# Patient Record
Sex: FEMALE | Race: ASIAN | NOT HISPANIC OR LATINO | Employment: PART TIME | URBAN - METROPOLITAN AREA
[De-identification: names, ages, dates, MRNs, and addresses within clinical notes are randomized per-mention and may not be internally consistent; named-entity substitution may affect disease eponyms.]

---

## 2021-03-03 ENCOUNTER — OFFICE VISIT - RIVER FALLS (OUTPATIENT)
Dept: FAMILY MEDICINE | Facility: CLINIC | Age: 26
End: 2021-03-03

## 2021-03-03 ASSESSMENT — MIFFLIN-ST. JEOR: SCORE: 1667.09

## 2022-01-02 PROCEDURE — U0003 INFECTIOUS AGENT DETECTION BY NUCLEIC ACID (DNA OR RNA); SEVERE ACUTE RESPIRATORY SYNDROME CORONAVIRUS 2 (SARS-COV-2) (CORONAVIRUS DISEASE [COVID-19]), AMPLIFIED PROBE TECHNIQUE, MAKING USE OF HIGH THROUGHPUT TECHNOLOGIES AS DESCRIBED BY CMS-2020-01-R: HCPCS | Mod: ORL | Performed by: NURSE PRACTITIONER

## 2022-01-07 ENCOUNTER — LAB REQUISITION (OUTPATIENT)
Dept: LAB | Facility: CLINIC | Age: 27
End: 2022-01-07
Payer: COMMERCIAL

## 2022-01-07 ENCOUNTER — AMBULATORY - RIVER FALLS (OUTPATIENT)
Dept: FAMILY MEDICINE | Facility: CLINIC | Age: 27
End: 2022-01-07

## 2022-01-07 ENCOUNTER — OFFICE VISIT - RIVER FALLS (OUTPATIENT)
Dept: FAMILY MEDICINE | Facility: CLINIC | Age: 27
End: 2022-01-07

## 2022-01-07 DIAGNOSIS — U07.1 COVID-19: ICD-10-CM

## 2022-01-08 LAB — SARS-COV-2 RNA RESP QL NAA+PROBE: POSITIVE

## 2022-01-09 ENCOUNTER — COMMUNICATION - RIVER FALLS (OUTPATIENT)
Dept: FAMILY MEDICINE | Facility: CLINIC | Age: 27
End: 2022-01-09

## 2022-01-09 ENCOUNTER — NURSE TRIAGE (OUTPATIENT)
Dept: NURSING | Facility: CLINIC | Age: 27
End: 2022-01-09
Payer: COMMERCIAL

## 2022-01-09 NOTE — TELEPHONE ENCOUNTER
"Coronavirus (COVID-19) Notification    Caller Name (Patient, parent, daughter/son, grandparent, etc)  Patient    Reason for call  Notify of Positive Coronavirus (COVID-19) lab results, assess symptoms,  review  [employer] recommendations    Lab Result    Lab test:  2019-nCoV rRt-PCR or SARS-CoV-2 PCR    Oropharyngeal AND/OR nasopharyngeal swabs is POSITIVE for 2019-nCoV RNA/SARS-COV-2 PCR (COVID-19 virus)    RN Recommendations/Instructions per [facility] Coronavirus COVID-19 recommendations    Brief introduction script  Introduce self then review script:  \"I am calling on behalf of [employer].  We were notified that your Coronavirus test (COVID-19) for was POSITIVE for the virus.  I have some information to relay to you but first I wanted to mention that the [place] Dept of Health will be contacting you shortly [it's possible MD already called Patient] to talk to you more about how you are feeling and other people you have had contact with who might now also have the virus.  Also,  [employer] is Partnering with the [facility] for Covid-19 research, you may be contacted directly by research staff.\"    Assessment (Inquire about Patient's current symptoms)   Assessment   Current Symptoms at time of phone call: (if no symptoms, document No symptoms] Cough, sore throat   Symptoms onset (if applicable) 8 days     If at time of call, Patients symptoms hare worsened, the Patient should contact 911 or have someone drive them to Emergency Dept promptly:      If Patient calling 911, inform 911 personal that you have tested positive for the Coronavirus (COVID-19).  Place mask on and await 911 to arrive.    If Emergency Dept, If possible, please have another adult drive you to the Emergency Dept but you need to wear mask when in contact with other people.      Monoclonal Antibody Administration    You may be eligible to receive a new treatment with a monoclonal antibody for preventing hospitalization " in patients at high risk for complications from COVID-19.   This medication is still experimental and available on a limited basis; it is given through an IV and must be given at an infusion center. Please note that not all people who are eligible will receive the medication since it is in limited supply.     Are you interested in being considered for this medication?  Yes.   Is the patient symptomatic?  Yes. Is the patient 18 years of age or older? Yes.  Is the patient newly (within the last week) on supplemental oxygen or requiring more oxygen than usual?  No. Patient criteria for selection: Is the patients weight equal to or greater than 40 kg (88 lbs)? Yes.  Is the patient's age 65 years or older?  No. Is the patient 55 years or older and have one or more of the following conditions: Hypertension, CHF, COPD/Chronic pulmonary disease?  No. Is the patient 18 years or older and has one or more of the following conditions: Chronic Kidney Disease, Diabetes Mellitus, BMI equal to or greater than 35, Immunosuppressive Disease or taking Immunosuppressive medications?  Yes.  Patient qualifies[place] refer [place].  Does the patient fit the criteria: Yes: Patient referred to [place] website, patient or family/friend will complete application.    Review information with Patient    Your result was positive. This means you have COVID-19 (coronavirus).  We have sent you a letter that reviews the information that I'll be reviewing with you now.    How can I protect others?    If you have symptoms: stay home and away from others (self-isolate) until:    You've had no fever--and no medicine that reduces fever--for 1 full day (24 hours). And       Your other symptoms have gotten better. For example, your cough or breathing has improved. And     At least 10 days have passed since your symptoms started. (If you've been told by a doctor that you have a weak immune system, wait 20 days.)     If you don't have symptoms: Stay home and away  from others (self-isolate) until at least 10 days have passed since your first positive COVID-19 test. (Date test collected)    During this time:    Stay in your own room, including for meals. Use your own bathroom if you can.    Stay away from others in your home. No hugging, kissing or shaking hands. No visitors.     Don't go to work, school or anywhere else.     Clean  high touch  surfaces often (doorknobs, counters, handles, etc.). Use a household cleaning spray or wipes. You'll find a full list on the EPA website at www.epa.gov/pesticide-registration/list-n-disinfectants-use-against-sars-cov-2.     Cover your mouth and nose with a mask, tissue or other face covering to avoid spreading germs.    Wash your hands and face often with soap and water.    Make a list of people you have been in close contact with recently, even if either of you wore a face covering.   - Start your list from 2 days before you became ill or had a positive test.  - Include anyone that was within 6 feet of you for a cumulative total of 15 minutes or more in 24 hours. (Example: if you sat next to Josh for 5 minutes in the morning and 10 minutes in the afternoon, then you were in close contact for 15 minutes total that day. Josh would be added to your list.)    A public health worker will call or text you. It is important that you answer. They will ask you questions about possible exposures to COVID-19, such as people you have been in direct contact with and places you have visited.    Tell the people on your list that you have COVID-19; they should stay away from others for 14 days starting from the last time they were in contact with you (unless you are told something different from a public health worker).     Caregivers in these groups are at risk for severe illness due to COVID-19:  o People 65 years and older  o People who live in a nursing home or long-term care facility  o People with chronic disease (lung, heart, cancer, diabetes,  kidney, liver, immunologic)  o People who have a weakened immune system, including those who:  - Are in cancer treatment  - Take medicine that weakens the immune system, such as corticosteroids  - Had a bone marrow or organ transplant  - Have an immune deficiency  - Have poorly controlled HIV or AIDS  - Are obese (body mass index of 40 or higher)  - Smoke regularly    Caregivers should wear gloves while washing dishes, handling laundry and cleaning bedrooms and bathrooms.    Wash and dry laundry with special caution. Don't shake dirty laundry, and use the warmest water setting you can.    If you have a weakened immune system, ask your doctor about other actions you should take.    For more tips, go to www.cdc.gov/coronavirus/2019-ncov/downloads/10Things.pdf.    You should not go back to work until you meet the guidelines above for ending your home isolation. You don't need to be retested for COVID-19 before going back to work--studies show that you won't spread the virus if it's been at least 10 days since your symptoms started (or 20 days, if you have a weak immune system).    Employers: This document serves as formal notice of your employee's medical guidelines for going back to work. They must meet the above guidelines before going back to work in person.    How can I take care of myself?    1. Get lots of rest. Drink extra fluids (unless a doctor has told you not to).    2. Take Tylenol (acetaminophen) for fever or pain. If you have liver or kidney problems, ask your family doctor if it's okay to take Tylenol.     Take either:     650 mg (two 325 mg pills) every 4 to 6 hours, or     1,000 mg (two 500 mg pills) every 8 hours as needed.     Note: Don't take more than 3,000 mg in one day. Acetaminophen is found in many medicines (both prescribed and over-the-counter medicines). Read all labels to be sure you don't take too much.    For children, check the Tylenol bottle for the right dose (based on their age or  weight).    3. If you have other health problems (like cancer, heart failure, an organ transplant or severe kidney disease): Call your specialty clinic if you don't feel better in the next 2 days.    4. Know when to call 911: Emergency warning signs include:    Trouble breathing or shortness of breath    Pain or pressure in the chest that doesn't go away    Feeling confused like you haven't felt before, or not being able to wake up    Bluish-colored lips or face    5. Sign up for [employer]. We know it's scary to hear that you have COVID-19. We want to track your symptoms to make sure you're okay over the next 2 weeks. Please look for an email from [employer]--this is a free, online program that we'll use to keep in touch. To sign up, follow the link in the email. Learn more at [email or web address].    Where can I get more information?    [facility]: [email or web address]    Coronavirus Basics: www.health.[facility].[place]./diseases/coronavirus/basics.html    What to Do If You're Sick: www.cdc.gov/coronavirus/2019-ncov/about/steps-when-sick.html    Ending Home Isolation: www.cdc.gov/coronavirus/2019-ncov/hcp/disposition-in-home-patients.html     Caring for Someone with COVID-19: www.cdc.gov/coronavirus/2019-ncov/if-you-are-sick/care-for-someone.html     [facility] clinical trials (COVID-19 research studies): clinicalaffairs.[facility]-clinical-trials     A Positive COVID-19 letter will be sent via [employer] or the mail. (Exception, no letters sent to Presurgerical/Preprocedure Patients)    Arely Goldstein RN

## 2022-01-10 ENCOUNTER — COMMUNICATION - RIVER FALLS (OUTPATIENT)
Dept: FAMILY MEDICINE | Facility: CLINIC | Age: 27
End: 2022-01-10

## 2022-02-06 ENCOUNTER — HEALTH MAINTENANCE LETTER (OUTPATIENT)
Age: 27
End: 2022-02-06

## 2022-02-12 VITALS
BODY MASS INDEX: 36.02 KG/M2 | HEART RATE: 88 BPM | TEMPERATURE: 98.4 F | WEIGHT: 211 LBS | HEIGHT: 64 IN | SYSTOLIC BLOOD PRESSURE: 142 MMHG | DIASTOLIC BLOOD PRESSURE: 96 MMHG

## 2022-02-16 NOTE — PROGRESS NOTES
Patient:   JOSE BHATT            MRN: [id number]            FIN: [id number]               Age:   26 years     Sex:  Female     :  1995   Associated Diagnoses:   Encounter for other general counseling and advice on contraception   Author:   Jed Oakes MD      Visit Information      Date of Service: 2021 10:36 am  Performing Location: [facility]  Encounter#: [id number]      Primary Care Provider (PCP):  NONE ,       Referring Provider:  Jed Oakes MD    NPI# [id number]      Chief Complaint   3/3/2021 10:40 AM CST    Discuss irregular menses. Has had since teenager. Had tried ocp in January and bled for about 2 weeks with cramping. Did also make feel nauseous. Moved here from [facility]. Has one son who is 4.      Interval History   The patient is in today for discussion of OCP.  She moved from the [facility] within the past year and decided that she would like to be on birth control and started the pill in January.  She had a lot of nausea and headaches on the pill that she was given in [place] but does know the type of pill and cannot find it.  She stopped it after about a month because of side effects but is willing to try it again.  She has a 4-year-old child and when not pregnant prior to that and a couple of years after the child, she did have somewhat irregular periods.  Now she is  and is concerned often when she has prolonged cycles that she is pregnant and would like some piece of mind.  She has no history of GYN issues otherwise.  She speaks fairly fluent English but I did write down some notes for her for better communication.  We discussed other methods of birth control.  I told her about the mini-pill and wrote that down but she would like to have regular withdrawal bleeding and is willing to try the pill again.       Review of Systems   Review  of systems is negative except as documented under interval history.      Health Status   Allergies:     Allergic Reactions (Selected)  No Known Medication Allergies   Medications:  (Selected)   Prescriptions  Prescribed  ethinyl estradiol-levonorgestrel 20 mcg-90 mcg oral tablet: 1 tab(s), Oral, daily, # 90 tab(s), 3 Refill(s), Type: Maintenance, Pharmacy: [id number], 1 tab(s) Oral daily, 64, in, 03/03/21 10:40:00 CST, Height Measured, 211, lb, 03/03/21 10:40:00 CST, Weight Measured  Documented Medications  Documented  Fish Oil: Oral, 0 Refill(s), Type: Maintenance  Iron 100 Plus: Oral, daily, 0 Refill(s), Type: Maintenance  Vitamin C: daily, 0 Refill(s), Type: Maintenance  Vitamin D3: daily, 0 Refill(s), Type: Maintenance   Problem list:    All Problems  Obesity / SNOMED CT 7100526021 / Probable      Histories   Past Medical History:    No active or resolved past medical history items have been selected or recorded.   Family History:    No family history items have been selected or recorded.   Procedure history:    No active procedure history items have been selected or recorded.   Social History:        Electronic Cigarette/Vaping Assessment            Electronic Cigarette Use: Never.      Tobacco Assessment            Never (less than 100 in lifetime)        Physical Examination   Vital Signs   3/3/2021 10:40 AM CST Temperature Tympanic 98.4 DegF    Peripheral Pulse Rate 88 bpm    Pulse Site Radial artery    HR Method Manual    Systolic Blood Pressure 142 mmHg  HI    Diastolic Blood Pressure 96 mmHg  HI    Mean Arterial Pressure 111 mmHg    BP Site Right arm    BP Method Manual      Gynecologic:  No exam is done today..       Impression and Plan   Diagnosis     Encounter for other general counseling and advice on contraception ([id number] Z30.09).     Plan:  We had around a 20 minute discussion and no examination was done today.  The patient will be tried on Loestrin 1/20 which is hopefully is a bit lower dose than she had before and we will see how she does on that.  She will let us know if she  would like to switch to other contraception..    Patient Instructions:       Counseled: Patient, Regarding diagnosis, Regarding treatment, Regarding medications, Verbalized understanding.

## 2022-02-16 NOTE — NURSING NOTE
Comprehensive Intake Entered On:  3/3/2021 10:50 AM CST    Performed On:  3/3/2021 10:40 AM CST by Leslie Rodriguez CMA               Summary   Chief Complaint :   Discuss irregular menses. Has had since teenager. Had tried ocp in January and bled for about 2 weeks with cramping. Did also make feel nauseous. Moved here from [facility]. Has one son who is 4.    Last Menstrual Period :   2021 CST   Weight Measured :   211 lb(Converted to: 211 lb 0 oz, 95.708 kg)    Height Measured :   64 in(Converted to: 5 ft 4 in, 162.56 cm)    Body Mass Index :   36.21 kg/m2 (HI)    Body Surface Area :   2.08 m2   Systolic Blood Pressure :   142 mmHg (HI)    Diastolic Blood Pressure :   96 mmHg (HI)    Mean Arterial Pressure :   111 mmHg   Peripheral Pulse Rate :   88 bpm   BP Site :   Right arm   Pulse Site :   Radial artery   BP Method :   Manual   HR Method :   Manual   Temperature Tympanic :   98.4 DegF(Converted to: 36.9 DegC)    Leslie Rodriguez CMA - 3/3/2021 10:40 AM CST   Health Status   Allergies Verified? :   Yes   Medication History Verified? :   Yes   Pre-Visit Planning Status :   Completed   Tobacco Use? :   Never smoker   Leslie Rodriguez CMA - 3/3/2021 10:40 AM CST   Demographics   Last Name :   Destini   First Name :   Jane   Responsible Party Date of Birth () :   1995 CST   Contact Relationship to Patient (other) :   Patient   [place] Leslie DURAN - 3/3/2021 10:40 AM CST   Consents   Consent for Immunization Exchange :   Consent Granted   Consent for Immunizations to Providers :   Consent Granted   Leslie Rodriguez CMA - 3/3/2021 10:40 AM CST   Meds / Allergies   (As Of: 3/3/2021 10:50:15 AM CST)   Allergies (Active)   No Known Medication Allergies  Estimated Onset Date:   Unspecified ; Created By:   Leslie Rodriguez CMA; Reaction Status:   Active ; Category:   Drug ; Substance:   No Known Medication Allergies ; Type:   Allergy ; Updated By:   Leslie Rodriguez CMA; Reviewed Date:   3/3/2021 10:47 AM CST         Medication List   (As Of: 3/3/2021 10:50:15 AM CST)   Home Meds    ascorbic acid  :   ascorbic acid ; Status:   Documented ; Ordered As Mnemonic:   Vitamin C ; Simple Display Line:   daily, 0 Refill(s) ; Catalog Code:   ascorbic acid ; Order Dt/Tm:   3/3/2021 10:46:55 AM CST          cholecalciferol  :   cholecalciferol ; Status:   Documented ; Ordered As Mnemonic:   Vitamin D3 ; Simple Display Line:   daily, 0 Refill(s) ; Catalog Code:   cholecalciferol ; Order Dt/Tm:   3/3/2021 10:46:44 AM CST          multivitamin with iron  :   multivitamin with iron ; Status:   Documented ; Ordered As Mnemonic:   Iron 100 Plus ; Simple Display Line:   Oral, daily, 0 Refill(s) ; Catalog Code:   multivitamin with iron ; Order Dt/Tm:   3/3/2021 10:46:39 AM CST          omega-3 polyunsaturated fatty acids  :   omega-3 polyunsaturated fatty acids ; Status:   Documented ; Ordered As Mnemonic:   Fish Oil ; Simple Display Line:   Oral, 0 Refill(s) ; Catalog Code:   omega-3 polyunsaturated fatty acids ; Order Dt/Tm:   3/3/2021 10:46:49 AM CST            ID Risk Screen   Recent Travel History :   No recent travel   Family Member Travel History :   No recent travel   Other Exposure to Infectious Disease :   Unknown   COVID-19 Testing Status :   No COVID-19 test performed   Leslie Rodriguez CMA - 3/3/2021 10:40 AM CST   Social History   Social History   (As Of: 3/3/2021 10:50:15 AM CST)   Tobacco:        Never (less than 100 in lifetime)   (Last Updated: 3/3/2021 10:47:55 AM CST by Leslie Rodriguez CMA)          Electronic Cigarette/Vaping:        Electronic Cigarette Use: Never.   (Last Updated: 3/3/2021 10:48:01 AM CST by Leslie Rodriguez CMA)

## 2022-03-02 NOTE — NURSING NOTE
Comprehensive Intake Entered On:  1/7/2022 2:02 PM CST    Performed On:  1/7/2022 1:54 PM CST by Lidia Bueno LPN               Summary   Chief Complaint :   sore throat, itchiness in the throat, sinus pain, hurts around her eyes, says she does have some loss of taste and smell that started last Saturday 01/01/2022 - verbal consent for video visit   Lidia Bueno LPN - 1/7/2022 1:54 PM CST   Health Status   Allergies Verified? :   Yes   Medication History Verified? :   Yes   Medical History Verified? :   No   Pre-Visit Planning Status :   Not completed   Tobacco Use? :   Never smoker   Lidia Bueno LPN - 1/7/2022 1:54 PM CST   Meds / Allergies   (As Of: 1/7/2022 2:02:13 PM CST)   Allergies (Active)   No Known Medication Allergies  Estimated Onset Date:   Unspecified ; Created By:   Lidia Bueno LPN; Reaction Status:   Active ; Category:   Drug ; Substance:   No Known Medication Allergies ; Type:   Allergy ; Updated By:   Lidia Bueno LPN; Reviewed Date:   1/7/2022 1:57 PM CST        Medication List   (As Of: 1/7/2022 2:02:13 PM CST)   Home Meds    Miscellaneous Prescription  :   Miscellaneous Prescription ; Status:   Documented ; Ordered As Mnemonic:   birth control pill ; Simple Display Line:   Oral, daily ; Catalog Code:   Miscellaneous Prescription ; Order Dt/Tm:   1/7/2022 2:01:22 PM CST          metFORMIN  :   metFORMIN ; Status:   Documented ; Ordered As Mnemonic:   metFORMIN 500 mg oral tablet ; Simple Display Line:   500 mg, 1 tab(s), Oral, bid ; Catalog Code:   metFORMIN ; Order Dt/Tm:   1/7/2022 2:01:07 PM CST            Social History   Social History   (As Of: 1/7/2022 2:02:13 PM CST)   Tobacco:        Never (less than 100 in lifetime)   (Last Updated: 1/7/2022 1:58:53 PM CST by Lidia Bueno LPN)          Electronic Cigarette/Vaping:        Electronic Cigarette Use: Never.   (Last Updated: 1/7/2022 1:58:57 PM CST by Lidia Bueno LPN)

## 2022-03-02 NOTE — TELEPHONE ENCOUNTER
---------------------  From: Ottoniel Carlson   Sent: 1/7/2022 3:10:56 PM CST  Subject: Curbside testing      Pt was seen at [facility] today for covid testing per [facility]. 02 Sat not taken today. Pt resides in [facility]-will notify Public Health if positive.

## 2022-03-02 NOTE — TELEPHONE ENCOUNTER
---------------------  From: Leanne Hill RN (Phone Messages Pool ([id number]))   To: [facility] Message Pool ([id number]_[facility]);     Sent: 1/10/2022 5:44:23 PM CST  Subject: General Message       PCP:  none      Time of Call:  4:21pm       Person Calling:  pt  Phone number:  [phone]    Note:   VM received requesting + covid results to be faxed to her employer. Fax # [phone]  Please assist with fax.    Last office visit and reason:  1/7/22 video covid [facility] patient information printed and faxed to number as requested. Confirmation received.  [phone]  Pt notified. Original note will be mailed to patient's home address. She is also request that her son be tested; however, he is not a patient here. Advised that she call back tomorrow morning to get him register if she still wants him tested here.

## 2022-03-02 NOTE — PROGRESS NOTES
Patient:   CHEYENNE BHATT            MRN: [id number]            FIN: [id number]               Age:   26 years     Sex:  Female     :  1995   Associated Diagnoses:   Cough; Loss of taste; Sore throat   Author:   Lavinia Gordon      Visit Information      Date of Service: 2022 07:39 am  Performing Location: [facility]  Encounter#: [id number]      Primary Care Provider (PCP):  NONE ,       Referring Provider:  Lavinia Gordon    NPI# [id number]   Visit type:  video.    Participants in room during visit:  _pt   Location of patient:  _home  Location of provider:  _ clinic  Video Start Time:  215  Video End Time:   _225    Today's visit was conducted via video conference due to the COVID-19 pandemic.  The patient's consent to proceed with a video visit has been obtained and documented.      Chief Complaint   2022 1:54 PM CST     sore throat, itchiness in the throat, sinus pain, hurts around her eyes, says she does have some loss of taste and smell that started last 2022 - verbal consent for video visit        History of Present Illness   Patient is a 26_ year old F_ who is being evaluated via a billable video visit.  she had sore throat a week ago after eating a cookie, her throat started to feel itchy. Started to take OTC counter cold meds but when she would eat salty or spicey foods then her throat feels itchy and starts to cough.  Honey relieved it. NO runnyt nose, no itchy eyes. she has a little sinus pressure and hurts around her eyes  no hx of covid infection  she had primary COVID vaccine J and J, has not had booster      Health Status   Allergies:    Allergic Reactions (Selected)  No Known Medication Allergies   Medications:  (Selected)   Documented Medications  Documented  birth control pill: birth control pill, Oral, daily, Type: Maintenance  metFORMIN 500 mg oral tablet: = 1 tab(s) ( 500 mg ), Oral, bid, Type: Maintenance,    Medications           *denotes recorded medication          *birth control pill: Oral, daily.          *metFORMIN 500 mg oral tablet: 500 mg, 1 tab(s), Oral, bid.       Problem list:    No problem items selected or recorded.      Histories   Past Medical History:    No active or resolved past medical history items have been selected or recorded.   Family History:    No family history items have been selected or recorded.   Procedure history:    No active procedure history items have been selected or recorded.   Social History:        Electronic Cigarette/Vaping Assessment            Electronic Cigarette Use: Never.      Tobacco Assessment            Never (less than 100 in lifetime)        Physical Examination   General:  Alert and oriented, No acute distress.    Eye:  Normal conjunctiva.    Respiratory:  Respirations are non-labored.    Psychiatric:  Cooperative, Appropriate mood & affect, Normal judgment.       Impression and Plan   Diagnosis     Cough (FJQ88-KR R05.9).     Loss of taste (NME68-DR R43.2).     Sore throat (WUC43-HH J02.9).     Patient Instructions:       Counseled: Patient, Patient is referred for [facility] COVID-19 testing and is instructed of the following:  Patient should remain isolated until results of test return and given that tests are not 100% accurate, would be safest to assume that they are contagious with COVID-19 until their symptoms have fully resolved. Isolation is recommended for at least 7 days from the onset of symptoms and for 3 days after resolution of fevers and productive cough, unless test is positive, or exposure with COVID positive contact is confirmed, then isolation should be for 14 days. This means patient should not go to work or any public areas. In addition, it is recommended at home that they separate themselves from other people and from animals as much as possible, including using a separate bathroom. If they do need to be around others, a face mask is recommended. Frequent hand hygiene  and cleaning of high touch surfaces is also recommended.   Symptoms can last for several weeks. For patients with COVID-19, they can sometimes start to improve and then get worse again. If symptoms worsen at any time, including significant shortness of breath, low oxygen levels, high fevers that cannot be controlled, or concerns for dehydration, they should seek medical care. If going to the ER, calling 911, or seeking care at the clinic, they are reminded to notify staff that they have been tested for COVID-19.  Patient also is informed that testing will be done in their car at a scheduled time.   Patient is also informed that testing for COVID-19 must be reported to the public health department along with contact information for the patient.   Patient information is given to scheduling staff to get patient scheduled for testing. Patient will receive further instructions from scheduling staff.  Patient is encouraged to call back at any time with questions or concerns.  .

## 2022-03-02 NOTE — TELEPHONE ENCOUNTER
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

## 2022-03-02 NOTE — TELEPHONE ENCOUNTER
---------------------  From: Marilin Mercado (Phone Messages Pool ([id number]_[facility]))   To: [facility]CA[employer] Message Pool ([id number]_[facility]);     Cc: Phone Messages Pool ([id number]);      Sent: 1/9/2022 8:42:31 AM CST  Subject: positive covid result      LVMTCB. Patient covid result from 1/7/22 is POSITIVE. Results have been faxed to [employer].---------------------  From: Lidia Bueno LPN ([employer] Message Pool ([id number]_[facility]))   To: Lavinia Gordon;     Sent: 1/10/2022 12:10:26 PM CST  Subject: FW: positive covid resultnotedTC with pt, stating she needs her test results for work. Stated she will be returning to work tomorrow, will contact with HR and obtain fax # and will call with the fax # tomorrow.

## 2022-10-03 ENCOUNTER — HEALTH MAINTENANCE LETTER (OUTPATIENT)
Age: 27
End: 2022-10-03

## 2023-01-06 ENCOUNTER — OFFICE VISIT (OUTPATIENT)
Dept: FAMILY MEDICINE | Facility: CLINIC | Age: 28
End: 2023-01-06
Payer: COMMERCIAL

## 2023-01-06 ENCOUNTER — ANCILLARY PROCEDURE (OUTPATIENT)
Dept: GENERAL RADIOLOGY | Facility: CLINIC | Age: 28
End: 2023-01-06
Attending: PHYSICIAN ASSISTANT
Payer: COMMERCIAL

## 2023-01-06 VITALS
WEIGHT: 186.51 LBS | OXYGEN SATURATION: 98 % | TEMPERATURE: 98.1 F | BODY MASS INDEX: 32.01 KG/M2 | HEART RATE: 80 BPM | RESPIRATION RATE: 20 BRPM | DIASTOLIC BLOOD PRESSURE: 88 MMHG | SYSTOLIC BLOOD PRESSURE: 136 MMHG

## 2023-01-06 DIAGNOSIS — M25.531 RIGHT WRIST PAIN: Primary | ICD-10-CM

## 2023-01-06 DIAGNOSIS — S66.911A STRAIN OF RIGHT WRIST, INITIAL ENCOUNTER: ICD-10-CM

## 2023-01-06 DIAGNOSIS — M25.531 RIGHT WRIST PAIN: ICD-10-CM

## 2023-01-06 PROBLEM — E66.9 OBESITY: Status: ACTIVE | Noted: 2023-01-06

## 2023-01-06 PROCEDURE — 73110 X-RAY EXAM OF WRIST: CPT | Mod: TC | Performed by: RADIOLOGY

## 2023-01-06 PROCEDURE — 99203 OFFICE O/P NEW LOW 30 MIN: CPT | Performed by: PHYSICIAN ASSISTANT

## 2023-01-06 RX ORDER — TIMOLOL MALEATE 5 MG/ML
1 SOLUTION/ DROPS OPHTHALMIC
COMMUNITY
Start: 2022-11-18

## 2023-01-06 ASSESSMENT — ENCOUNTER SYMPTOMS
NUMBNESS: 0
MYALGIAS: 1
JOINT SWELLING: 0
PARESTHESIAS: 0

## 2023-01-06 NOTE — PROGRESS NOTES
Assessment & Plan     Right wrist pain  Will place in wrist splint she will elevate she will rest she will use anti-inflammatories as needed we will follow-up in 3 to 4 weeks if not improved prior if it worsens  - XR Wrist Right G/E 3 Views    Strain of right wrist, initial encounter  See above    Results for orders placed or performed in visit on 01/06/23   XR Wrist Right G/E 3 Views     Status: None    Narrative    EXAM: XR WRIST RIGHT G/E 3 VIEWS  LOCATION: [facility]  DATE/TIME: 1/6/2023 1:13 PM    INDICATION:  Right wrist pain  COMPARISON: None.      Impression    IMPRESSION: The right wrist is negative for fracture. Normal carpal alignment.                    No follow-ups on file.    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 weight but there is some lifting with the arm extended in front of her is not had problems before this is not a work comp injury she has tried some icing and some stretching this has not been a concern previously she is right-hand dominant    History of Present Illness       Reason for visit:  Wrist pain  Symptom onset:  3-4 weeks ago  Symptom intensity:  Mild  Symptom progression:  Staying the same  Had these symptoms before:  No    She eats 2-3 servings of fruits and vegetables daily.She consumes 1 sweetened beverage(s) daily.She exercises with enough effort to increase her heart rate 60 or more minutes per day.  She exercises with enough effort to increase her heart rate 5 days per week.   She is taking medications regularly.     No Known  Injury.         Review of Systems   Musculoskeletal: Positive for myalgias. Negative for joint swelling.   Neurological: Negative for numbness and paresthesias.            Objective    /88   Pulse 80   Temp 98.1  F (36.7  C)   Resp 20   Wt 84.6 kg (186 lb 8.2 oz)   SpO2 98%   BMI 32.01 kg/m    Body mass index is 32.01 kg/m .  Physical Exam she has intact and strong equal radial pulses bilaterally capillary refill is less than 2 seconds some tenderness around the ulnar styloid a little bit distal to that and in the forearm extensors she does not really have tenderness around the lateral epicondylar structure no olecranon tenderness no ulnar groove tenderness full range of motion of the wrist does have tenderness with forced dorsi flexion and tenderness with ulnar and radial deviation there is no rash on the skin is intact

## 2023-02-12 ENCOUNTER — HEALTH MAINTENANCE LETTER (OUTPATIENT)
Age: 28
End: 2023-02-12

## 2023-05-20 ENCOUNTER — HEALTH MAINTENANCE LETTER (OUTPATIENT)
Age: 28
End: 2023-05-20

## 2024-07-27 ENCOUNTER — HEALTH MAINTENANCE LETTER (OUTPATIENT)
Age: 29
End: 2024-07-27

## 2025-08-10 ENCOUNTER — HEALTH MAINTENANCE LETTER (OUTPATIENT)
Age: 30
End: 2025-08-10